# Patient Record
Sex: MALE | Race: BLACK OR AFRICAN AMERICAN | Employment: FULL TIME | URBAN - METROPOLITAN AREA
[De-identification: names, ages, dates, MRNs, and addresses within clinical notes are randomized per-mention and may not be internally consistent; named-entity substitution may affect disease eponyms.]

---

## 2019-01-10 PROCEDURE — 87086 URINE CULTURE/COLONY COUNT: CPT | Performed by: INTERNAL MEDICINE

## 2020-10-29 ENCOUNTER — HOSPITAL ENCOUNTER (EMERGENCY)
Facility: HOSPITAL | Age: 45
Discharge: HOME | End: 2020-10-30
Attending: EMERGENCY MEDICINE
Payer: COMMERCIAL

## 2020-10-29 DIAGNOSIS — T78.2XXA ANAPHYLAXIS, INITIAL ENCOUNTER: Primary | ICD-10-CM

## 2020-10-29 PROCEDURE — 96374 THER/PROPH/DIAG INJ IV PUSH: CPT

## 2020-10-29 PROCEDURE — 96375 TX/PRO/DX INJ NEW DRUG ADDON: CPT

## 2020-10-29 PROCEDURE — 96372 THER/PROPH/DIAG INJ SC/IM: CPT

## 2020-10-29 PROCEDURE — 99284 EMERGENCY DEPT VISIT MOD MDM: CPT

## 2020-10-29 PROCEDURE — S0028 INJECTION, FAMOTIDINE, 20 MG: HCPCS | Performed by: EMERGENCY MEDICINE

## 2020-10-29 RX ORDER — METHYLPREDNISOLONE SODIUM SUCCINATE 125 MG/2ML
125 INJECTION, POWDER, LYOPHILIZED, FOR SOLUTION INTRAMUSCULAR; INTRAVENOUS ONCE
Status: COMPLETED | OUTPATIENT
Start: 2020-10-29 | End: 2020-10-29

## 2020-10-29 RX ORDER — PREDNISONE 20 MG/1
40 TABLET ORAL DAILY
Qty: 10 TABLET | Refills: 0 | Status: SHIPPED | OUTPATIENT
Start: 2020-10-29 | End: 2020-11-03

## 2020-10-29 RX ORDER — FAMOTIDINE 10 MG/ML
20 INJECTION, SOLUTION INTRAVENOUS ONCE
Status: COMPLETED | OUTPATIENT
Start: 2020-10-29 | End: 2020-10-29

## 2020-10-29 RX ORDER — DIPHENHYDRAMINE HYDROCHLORIDE 50 MG/ML
25 INJECTION INTRAMUSCULAR; INTRAVENOUS ONCE
Status: DISCONTINUED | OUTPATIENT
Start: 2020-10-29 | End: 2020-10-30

## 2020-10-29 RX ORDER — FAMOTIDINE 20 MG/1
20 TABLET ORAL 2 TIMES DAILY
Qty: 10 TABLET | Refills: 0 | Status: SHIPPED | OUTPATIENT
Start: 2020-10-29 | End: 2020-11-03

## 2020-10-29 RX ORDER — EPINEPHRINE 0.3 MG/.3ML
0.3 INJECTION SUBCUTANEOUS
Qty: 1 EACH | Refills: 0 | Status: SHIPPED | OUTPATIENT
Start: 2020-10-29 | End: 2020-11-28

## 2020-10-30 VITALS
SYSTOLIC BLOOD PRESSURE: 102 MMHG | TEMPERATURE: 98 F | OXYGEN SATURATION: 97 % | DIASTOLIC BLOOD PRESSURE: 80 MMHG | WEIGHT: 200 LBS | BODY MASS INDEX: 29.62 KG/M2 | RESPIRATION RATE: 25 BRPM | HEART RATE: 68 BPM | HEIGHT: 69 IN

## 2020-10-30 NOTE — ED INITIAL ASSESSMENT (HPI)
Pt ambulatory into triage with steady gait. Pt presents with lip & tongue swelling and pruritis to bl hands x35min. Pt with known allergy to peanuts, but does not believe he was exposed to that today. Pt denies eating any new foods today.   Pt denies feel

## 2020-10-30 NOTE — ED PROVIDER NOTES
Patient Seen in: [facility] AND [facility] Emergency Department      History   Patient presents with:   Allergic Rxn Allergies    Stated Complaint: Allergic Rx    HPI    50-year-old male with history of peanut allergies here with allergic reaction 35 minutes prio °F (36.6 °C) (Oral)   Resp 18   Ht 175.3 cm (5' 9\")   Wt 90.7 kg   SpO2 95%   BMI 29.53 kg/m²         Physical Exam  Vitals signs and nursing note reviewed. HENT:      Head: Normocephalic.       Mouth/Throat:      Mouth: Mucous membranes are moist. the complexity of his ED evaluation.     - pt  comfortable with d/c at this time, will d/c pt home now with Rx for epipen, benadryl, pepcid, prednisone, pt to f/u with Dr. Ida Barron in 2 days or return to ED sooner if symptoms worsen including fevers, chills,

## 2020-10-30 NOTE — ED NOTES
Patient presents to ED with lip and tongue swelling, itching to bilateral arms/hands. Patient states he is allergic to peanuts. PIV established and IV meds given. IM Epi given. Patient on cardiac monitor and SpO2 monitoring.  Patient aware and agreeable to

## 2023-06-15 NOTE — H&P
HPI:     Veronica Jones is a 51year old male who presents as a consult for vasectomy. Number of children: 4    He desires permanent sterility via bilateral vasectomy. He understands the risks of bleeding, infection, chronic pain, atrophy, the one in 1000 risk of recanalization. He understands that he should continue to use contraception until we have a semen sample showing no sperm present. He also understands the need to wear a scrotal supporter for a minimum of one week. He also understands that he is not to lift anything heavier than 10 pounds for the next 4 days. Patient was given vasectomy information and office protocol pre-operative and post-operative instructions.  exam: easily palpable vas bilaterally    He is not sure if he wants to do vasectomy in the office versus under anesthesia. Wants to call us back after he thinks about things. HISTORY:  No past medical history on file. No past surgical history on file. No family history on file. Social History:   Social History     Socioeconomic History    Marital status:    Tobacco Use    Smoking status: Never    Smokeless tobacco: Never   Substance and Sexual Activity    Alcohol use: Yes    Drug use: No        Medications (Active prior to today's visit):  Current Outpatient Medications   Medication Sig Dispense Refill    diazePAM (VALIUM) 10 MG Oral Tab Take 1 tablet (10 mg total) by mouth See Admin Instructions. Take 1-2 tablets by mouth 20-30 minutes before procedure. 2 tablet 0    traMADol 50 MG Oral Tab Take 1 tablet (50 mg total) by mouth every 6 (six) hours as needed for Pain. 15 tablet 0       Allergies:    Peanut-Containing D*    SWELLING      ROS:     A comprehensive 10 point review of systems was completed. Pertinent positives and negatives noted in the the HPI.     PHYSICAL EXAM:     GENERAL APPEARANCE: well, developed, well nourished, in no acute distress  NEUROLOGIC: nonfocal, alert and oriented  HEAD: normocephalic, atraumatic  EYES: sclera non-icteric  EARS: hearing intact  ORAL CAVITY: mucosa moist  NECK/THYROID: no obvious goiter or masses  LUNGS: nonlabored breathing  ABDOMEN: soft, no obvious masses or tenderness  SKIN: no obvious rashes    : as noted above     ASSESSMENT/PLAN:   Diagnoses and all orders for this visit:    Encounter for sterilization  -     diazePAM (VALIUM) 10 MG Oral Tab; Take 1 tablet (10 mg total) by mouth See Admin Instructions. Take 1-2 tablets by mouth 20-30 minutes before procedure. -     traMADol 50 MG Oral Tab; Take 1 tablet (50 mg total) by mouth every 6 (six) hours as needed for Pain.      - as noted above    Thanks again for this consult.     Santiago Benjamin MD, [address] [place] [address] Street

## 2023-06-26 ENCOUNTER — TELEPHONE (OUTPATIENT)
Dept: SURGERY | Facility: CLINIC | Age: 48
End: 2023-06-26

## 2023-06-26 ENCOUNTER — OFFICE VISIT (OUTPATIENT)
Dept: SURGERY | Facility: CLINIC | Age: 48
End: 2023-06-26

## 2023-06-26 DIAGNOSIS — Z30.2 ENCOUNTER FOR STERILIZATION: Primary | ICD-10-CM

## 2023-06-26 PROCEDURE — 99203 OFFICE O/P NEW LOW 30 MIN: CPT | Performed by: UROLOGY

## 2023-06-26 RX ORDER — TRAMADOL HYDROCHLORIDE 50 MG/1
50 TABLET ORAL EVERY 6 HOURS PRN
Qty: 15 TABLET | Refills: 0 | Status: SHIPPED | OUTPATIENT
Start: 2023-06-26

## 2023-06-26 RX ORDER — DIAZEPAM 10 MG/1
10 TABLET ORAL SEE ADMIN INSTRUCTIONS
Qty: 2 TABLET | Refills: 0 | Status: SHIPPED | OUTPATIENT
Start: 2023-06-26

## 2023-06-26 NOTE — TELEPHONE ENCOUNTER
FYI only -     Patient is not sure if he wants to do vasectomy in the office versus under anesthesia. Wants to call us back after he thinks about things. If he wants to do under anesthesia let me know and I'll send in a ticket.

## 2023-09-14 ENCOUNTER — HOSPITAL ENCOUNTER (EMERGENCY)
Age: 48
Discharge: HOME | End: 2023-09-14
Attending: EMERGENCY MEDICINE
Payer: COMMERCIAL

## 2023-09-14 VITALS
HEART RATE: 81 BPM | HEIGHT: 70 IN | BODY MASS INDEX: 27.49 KG/M2 | WEIGHT: 192 LBS | DIASTOLIC BLOOD PRESSURE: 68 MMHG | RESPIRATION RATE: 20 BRPM | SYSTOLIC BLOOD PRESSURE: 115 MMHG | OXYGEN SATURATION: 96 % | TEMPERATURE: 98 F

## 2023-09-14 DIAGNOSIS — T78.2XXA ANAPHYLAXIS, INITIAL ENCOUNTER: ICD-10-CM

## 2023-09-14 DIAGNOSIS — T78.40XA ALLERGIC REACTION, INITIAL ENCOUNTER: Primary | ICD-10-CM

## 2023-09-14 PROCEDURE — 99285 EMERGENCY DEPT VISIT HI MDM: CPT

## 2023-09-14 PROCEDURE — 96375 TX/PRO/DX INJ NEW DRUG ADDON: CPT

## 2023-09-14 PROCEDURE — 96374 THER/PROPH/DIAG INJ IV PUSH: CPT

## 2023-09-14 PROCEDURE — 96372 THER/PROPH/DIAG INJ SC/IM: CPT

## 2023-09-14 PROCEDURE — 99284 EMERGENCY DEPT VISIT MOD MDM: CPT

## 2023-09-14 PROCEDURE — 96361 HYDRATE IV INFUSION ADD-ON: CPT

## 2023-09-14 PROCEDURE — S0028 INJECTION, FAMOTIDINE, 20 MG: HCPCS

## 2023-09-14 RX ORDER — EPINEPHRINE 0.3 MG/.3ML
0.3 INJECTION SUBCUTANEOUS ONCE AS NEEDED
Qty: 1 EACH | Refills: 0 | Status: SHIPPED | OUTPATIENT
Start: 2023-09-14 | End: 2023-09-14

## 2023-09-14 RX ORDER — METHYLPREDNISOLONE SODIUM SUCCINATE 125 MG/2ML
125 INJECTION, POWDER, LYOPHILIZED, FOR SOLUTION INTRAMUSCULAR; INTRAVENOUS ONCE
Status: COMPLETED | OUTPATIENT
Start: 2023-09-14 | End: 2023-09-14

## 2023-09-14 RX ORDER — DIPHENHYDRAMINE HYDROCHLORIDE 50 MG/ML
25 INJECTION INTRAMUSCULAR; INTRAVENOUS ONCE
Status: COMPLETED | OUTPATIENT
Start: 2023-09-14 | End: 2023-09-14

## 2023-09-14 RX ORDER — PREDNISONE 20 MG/1
40 TABLET ORAL DAILY
Qty: 10 TABLET | Refills: 0 | Status: SHIPPED | OUTPATIENT
Start: 2023-09-14 | End: 2023-09-19

## 2023-09-14 RX ORDER — FAMOTIDINE 10 MG/ML
INJECTION, SOLUTION INTRAVENOUS
Status: COMPLETED
Start: 2023-09-14 | End: 2023-09-14

## 2023-09-14 RX ORDER — DIPHENHYDRAMINE HYDROCHLORIDE 50 MG/ML
INJECTION INTRAMUSCULAR; INTRAVENOUS
Status: COMPLETED
Start: 2023-09-14 | End: 2023-09-14

## 2023-09-14 RX ORDER — FAMOTIDINE 10 MG/ML
20 INJECTION, SOLUTION INTRAVENOUS ONCE
Status: COMPLETED | OUTPATIENT
Start: 2023-09-14 | End: 2023-09-14

## 2023-09-14 RX ORDER — METHYLPREDNISOLONE SODIUM SUCCINATE 125 MG/2ML
INJECTION, POWDER, LYOPHILIZED, FOR SOLUTION INTRAMUSCULAR; INTRAVENOUS
Status: COMPLETED
Start: 2023-09-14 | End: 2023-09-14

## 2023-09-14 NOTE — DISCHARGE INSTRUCTIONS
Go directly to the pharmacy,  the EpiPen and prednisone, and make sure you always have an EpiPen on you. Avoid all products containing peanuts and/or walnuts until you see an allergist.  Benadryl every 6 hours for the next 2-3 days and then as needed. If you need to stay alert, okay to substitute Claritin, Allegra or Zyrtec  Prednisone for 4 more days. Pepcid twice a day for 7 days  Drink lots of water. Keep Benadryl and EpiPen handy at all times.  Keep the extra dose of prednisone ready as well  Followup for allergy testing  Return for any problems

## 2023-09-14 NOTE — ED INITIAL ASSESSMENT (HPI)
Patient ate walnuts around 1426. Patient never reacted to walnuts before but patient describes nausea, itching, and sensation of tongue swelling. 3 benadryl 1 hour ago.